# Patient Record
Sex: FEMALE | Race: OTHER | Employment: STUDENT | URBAN - METROPOLITAN AREA
[De-identification: names, ages, dates, MRNs, and addresses within clinical notes are randomized per-mention and may not be internally consistent; named-entity substitution may affect disease eponyms.]

---

## 2018-12-14 ENCOUNTER — HOSPITAL ENCOUNTER (EMERGENCY)
Age: 23
Discharge: HOME | End: 2018-12-15
Attending: EMERGENCY MEDICINE
Payer: COMMERCIAL

## 2018-12-14 DIAGNOSIS — N93.8 DUB (DYSFUNCTIONAL UTERINE BLEEDING): ICD-10-CM

## 2018-12-14 DIAGNOSIS — N93.9 VAGINAL BLEEDING: Primary | ICD-10-CM

## 2018-12-14 PROCEDURE — 99283 EMERGENCY DEPT VISIT LOW MDM: CPT

## 2018-12-15 VITALS
TEMPERATURE: 98 F | RESPIRATION RATE: 18 BRPM | HEART RATE: 102 BPM | BODY MASS INDEX: 25.52 KG/M2 | OXYGEN SATURATION: 99 % | WEIGHT: 130 LBS | SYSTOLIC BLOOD PRESSURE: 115 MMHG | HEIGHT: 60 IN | DIASTOLIC BLOOD PRESSURE: 57 MMHG

## 2018-12-15 VITALS
HEART RATE: 86 BPM | DIASTOLIC BLOOD PRESSURE: 71 MMHG | SYSTOLIC BLOOD PRESSURE: 131 MMHG | HEIGHT: 60 IN | WEIGHT: 130 LBS | OXYGEN SATURATION: 96 % | TEMPERATURE: 98.7 F | RESPIRATION RATE: 16 BRPM | BODY MASS INDEX: 25.52 KG/M2

## 2018-12-15 DIAGNOSIS — S37.69XA ABRASION OF CERVIX, INITIAL ENCOUNTER: ICD-10-CM

## 2018-12-15 DIAGNOSIS — N93.9 VAGINAL BLEEDING: Primary | ICD-10-CM

## 2018-12-15 DIAGNOSIS — R42 DIZZINESS: ICD-10-CM

## 2018-12-15 LAB
ALBUMIN SERPL-MCNC: 4.3 G/DL (ref 3.9–4.9)
ALP BLD-CCNC: 54 U/L (ref 40–130)
ALT SERPL-CCNC: <5 U/L (ref 0–33)
ANION GAP SERPL CALCULATED.3IONS-SCNC: 14 MEQ/L (ref 7–13)
AST SERPL-CCNC: 10 U/L (ref 0–35)
BASOPHILS ABSOLUTE: 0.1 K/UL (ref 0–0.2)
BASOPHILS ABSOLUTE: 0.1 K/UL (ref 0–0.2)
BASOPHILS RELATIVE PERCENT: 0.5 %
BASOPHILS RELATIVE PERCENT: 0.7 %
BILIRUB SERPL-MCNC: 0.3 MG/DL (ref 0–1.2)
BUN BLDV-MCNC: 15 MG/DL (ref 6–20)
CALCIUM SERPL-MCNC: 9.3 MG/DL (ref 8.6–10.2)
CHLORIDE BLD-SCNC: 103 MEQ/L (ref 98–107)
CO2: 22 MEQ/L (ref 22–29)
CREAT SERPL-MCNC: 0.75 MG/DL (ref 0.5–0.9)
EOSINOPHILS ABSOLUTE: 0.1 K/UL (ref 0–0.7)
EOSINOPHILS ABSOLUTE: 0.1 K/UL (ref 0–0.7)
EOSINOPHILS RELATIVE PERCENT: 0.7 %
EOSINOPHILS RELATIVE PERCENT: 1.1 %
GFR AFRICAN AMERICAN: >60
GFR NON-AFRICAN AMERICAN: >60
GLOBULIN: 2.2 G/DL (ref 2.3–3.5)
GLUCOSE BLD-MCNC: 153 MG/DL (ref 74–109)
HCG QUALITATIVE: NEGATIVE
HCT VFR BLD CALC: 32.9 % (ref 37–47)
HCT VFR BLD CALC: 36.8 % (ref 37–47)
HEMOGLOBIN: 11.3 G/DL (ref 12–16)
HEMOGLOBIN: 12.5 G/DL (ref 12–16)
LYMPHOCYTES ABSOLUTE: 1.6 K/UL (ref 1–4.8)
LYMPHOCYTES ABSOLUTE: 2.9 K/UL (ref 1–4.8)
LYMPHOCYTES RELATIVE PERCENT: 21.7 %
LYMPHOCYTES RELATIVE PERCENT: 29.9 %
MCH RBC QN AUTO: 31 PG (ref 27–31.3)
MCH RBC QN AUTO: 31.4 PG (ref 27–31.3)
MCHC RBC AUTO-ENTMCNC: 34 % (ref 33–37)
MCHC RBC AUTO-ENTMCNC: 34.4 % (ref 33–37)
MCV RBC AUTO: 91.3 FL (ref 82–100)
MCV RBC AUTO: 91.5 FL (ref 82–100)
MONOCYTES ABSOLUTE: 0.5 K/UL (ref 0.2–0.8)
MONOCYTES ABSOLUTE: 0.6 K/UL (ref 0.2–0.8)
MONOCYTES RELATIVE PERCENT: 6.1 %
MONOCYTES RELATIVE PERCENT: 6.4 %
NEUTROPHILS ABSOLUTE: 5.2 K/UL (ref 1.4–6.5)
NEUTROPHILS ABSOLUTE: 6 K/UL (ref 1.4–6.5)
NEUTROPHILS RELATIVE PERCENT: 62.4 %
NEUTROPHILS RELATIVE PERCENT: 70.5 %
PDW BLD-RTO: 12.8 % (ref 11.5–14.5)
PDW BLD-RTO: 12.9 % (ref 11.5–14.5)
PLATELET # BLD: 254 K/UL (ref 130–400)
PLATELET # BLD: 294 K/UL (ref 130–400)
POTASSIUM SERPL-SCNC: 3.6 MEQ/L (ref 3.5–5.1)
RBC # BLD: 3.59 M/UL (ref 4.2–5.4)
RBC # BLD: 4.03 M/UL (ref 4.2–5.4)
SODIUM BLD-SCNC: 139 MEQ/L (ref 132–144)
TOTAL PROTEIN: 6.5 G/DL (ref 6.4–8.1)
WBC # BLD: 7.4 K/UL (ref 4.8–10.8)
WBC # BLD: 9.7 K/UL (ref 4.8–10.8)

## 2018-12-15 PROCEDURE — 80053 COMPREHEN METABOLIC PANEL: CPT

## 2018-12-15 PROCEDURE — 96374 THER/PROPH/DIAG INJ IV PUSH: CPT

## 2018-12-15 PROCEDURE — 99283 EMERGENCY DEPT VISIT LOW MDM: CPT

## 2018-12-15 PROCEDURE — 12001 RPR S/N/AX/GEN/TRNK 2.5CM/<: CPT

## 2018-12-15 PROCEDURE — 6360000002 HC RX W HCPCS: Performed by: EMERGENCY MEDICINE

## 2018-12-15 PROCEDURE — 36415 COLL VENOUS BLD VENIPUNCTURE: CPT

## 2018-12-15 PROCEDURE — 84703 CHORIONIC GONADOTROPIN ASSAY: CPT

## 2018-12-15 PROCEDURE — 2580000003 HC RX 258: Performed by: EMERGENCY MEDICINE

## 2018-12-15 PROCEDURE — 96375 TX/PRO/DX INJ NEW DRUG ADDON: CPT

## 2018-12-15 PROCEDURE — 85025 COMPLETE CBC W/AUTO DIFF WBC: CPT

## 2018-12-15 RX ORDER — ONDANSETRON 2 MG/ML
4 INJECTION INTRAMUSCULAR; INTRAVENOUS ONCE
Status: COMPLETED | OUTPATIENT
Start: 2018-12-15 | End: 2018-12-15

## 2018-12-15 RX ORDER — BUPROPION HYDROCHLORIDE 300 MG/1
300 TABLET ORAL EVERY MORNING
COMMUNITY

## 2018-12-15 RX ORDER — SODIUM CHLORIDE 9 MG/ML
INJECTION, SOLUTION INTRAVENOUS CONTINUOUS
Status: DISCONTINUED | OUTPATIENT
Start: 2018-12-15 | End: 2018-12-15 | Stop reason: HOSPADM

## 2018-12-15 RX ORDER — LORAZEPAM 2 MG/ML
1 INJECTION INTRAMUSCULAR ONCE
Status: COMPLETED | OUTPATIENT
Start: 2018-12-15 | End: 2018-12-15

## 2018-12-15 RX ORDER — SODIUM CHLORIDE 0.9 % (FLUSH) 0.9 %
3 SYRINGE (ML) INJECTION EVERY 8 HOURS
Status: DISCONTINUED | OUTPATIENT
Start: 2018-12-15 | End: 2018-12-15 | Stop reason: HOSPADM

## 2018-12-15 RX ORDER — 0.9 % SODIUM CHLORIDE 0.9 %
1000 INTRAVENOUS SOLUTION INTRAVENOUS ONCE
Status: COMPLETED | OUTPATIENT
Start: 2018-12-15 | End: 2018-12-15

## 2018-12-15 RX ORDER — CITALOPRAM 10 MG/1
10 TABLET ORAL DAILY
COMMUNITY

## 2018-12-15 RX ADMIN — LORAZEPAM 1 MG: 2 INJECTION INTRAMUSCULAR; INTRAVENOUS at 07:44

## 2018-12-15 RX ADMIN — ONDANSETRON 4 MG: 2 SOLUTION INTRAMUSCULAR; INTRAVENOUS at 07:21

## 2018-12-15 RX ADMIN — SODIUM CHLORIDE 1000 ML: 9 INJECTION, SOLUTION INTRAVENOUS at 07:21

## 2018-12-15 RX ADMIN — SODIUM CHLORIDE 150 ML: 9 INJECTION, SOLUTION INTRAVENOUS at 07:51

## 2018-12-15 RX ADMIN — ONDANSETRON 4 MG: 2 SOLUTION INTRAMUSCULAR; INTRAVENOUS at 07:44

## 2018-12-15 ASSESSMENT — ENCOUNTER SYMPTOMS
EYE PAIN: 0
BACK PAIN: 0
DIARRHEA: 0
EYE DISCHARGE: 0
CHEST TIGHTNESS: 0
SORE THROAT: 0
ABDOMINAL PAIN: 0
SINUS PRESSURE: 0
VOMITING: 0
VOICE CHANGE: 0
EYE REDNESS: 0
VOMITING: 0
CHOKING: 0
COUGH: 0
EYE REDNESS: 0
CONSTIPATION: 0
SORE THROAT: 0
COUGH: 0
STRIDOR: 0
PHOTOPHOBIA: 0
FACIAL SWELLING: 0
STRIDOR: 0
EYE DISCHARGE: 0
SHORTNESS OF BREATH: 0
BLOOD IN STOOL: 0
NAUSEA: 0
EYE PAIN: 0
BLOOD IN STOOL: 0
WHEEZING: 0
WHEEZING: 0
SHORTNESS OF BREATH: 0
BACK PAIN: 0
TROUBLE SWALLOWING: 0
CONSTIPATION: 0
DIARRHEA: 0
ABDOMINAL PAIN: 0

## 2018-12-15 ASSESSMENT — PAIN DESCRIPTION - ORIENTATION: ORIENTATION: LOWER

## 2018-12-15 ASSESSMENT — PAIN SCALES - GENERAL: PAINLEVEL_OUTOF10: 2

## 2018-12-15 ASSESSMENT — PAIN DESCRIPTION - DESCRIPTORS: DESCRIPTORS: CRAMPING

## 2018-12-15 ASSESSMENT — PAIN DESCRIPTION - LOCATION: LOCATION: ABDOMEN;VAGINA

## 2018-12-15 NOTE — ED NOTES
In the room with Pt and . Large clots expelled from vagina. Suction applied to vaginal area to help with bleeding control. Pelvic exam and speculum placed by Dr. Cyndy Palomino, silver nitrate to laceration area inside of vagina by Dr. Cyndy Palomino. Packing then inserted by Dr. Cyndy Palomino to help with bleeding control. Pt bisi well. Pt then cleaned of blood from legs and vaginal area and mesh panties placed with 2 abd pads to help hold packing into vaginal area, Pt bisi well. Pt then repositioned on cart and covered with warm blankets for comfort.   Pt aware of plan of care,     Flaco Chavez RN  12/15/18 [id number]

## 2018-12-15 NOTE — ED PROVIDER NOTES
(Please note that portions of this note were completed with a voice recognition program.  Efforts were made to edit the dictations but occasionally words are mis-transcribed.)    Jus Harris MD (electronically signed)  Attending Emergency Physician       Jus Harris MD  12/15/18 Disha Glass MD  12/15/18 [id number]